# Patient Record
Sex: MALE | URBAN - METROPOLITAN AREA
[De-identification: names, ages, dates, MRNs, and addresses within clinical notes are randomized per-mention and may not be internally consistent; named-entity substitution may affect disease eponyms.]

---

## 2021-08-05 ENCOUNTER — OFFICE VISIT (OUTPATIENT)
Dept: URBAN - METROPOLITAN AREA CLINIC 31 | Facility: CLINIC | Age: 86
End: 2021-08-05
Payer: COMMERCIAL

## 2021-08-05 DIAGNOSIS — H35.372 PUCKERING OF MACULA, LEFT EYE: ICD-10-CM

## 2021-08-05 DIAGNOSIS — H43.813 VITREOUS DEGENERATION, BILATERAL: ICD-10-CM

## 2021-08-05 DIAGNOSIS — H35.3132 NEXDTVE AGE-RELATED MCLR DEGN, BILATERAL, INTERMED DRY STAGE: Primary | ICD-10-CM

## 2021-08-05 DIAGNOSIS — Z96.1 PRESENCE OF INTRAOCULAR LENS: ICD-10-CM

## 2021-08-05 DIAGNOSIS — H02.409 PTOSIS OF EYELID: ICD-10-CM

## 2021-08-05 PROCEDURE — 92134 CPTRZ OPH DX IMG PST SGM RTA: CPT | Performed by: OPHTHALMOLOGY

## 2021-08-05 PROCEDURE — 99204 OFFICE O/P NEW MOD 45 MIN: CPT | Performed by: OPHTHALMOLOGY

## 2021-08-05 ASSESSMENT — INTRAOCULAR PRESSURE
OD: 11
OS: 11

## 2021-08-05 NOTE — IMPRESSION/PLAN
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 Plan: Exam demonstrates dermatochalasis with ptosis. This may be starting to become visually significant as patient is manually trying to raise his eyelids. Recommend referral for evaluation.

## 2022-08-11 ENCOUNTER — OFFICE VISIT (OUTPATIENT)
Dept: URBAN - METROPOLITAN AREA CLINIC 31 | Facility: CLINIC | Age: 87
End: 2022-08-11
Payer: COMMERCIAL

## 2022-08-11 DIAGNOSIS — H35.372 PUCKERING OF MACULA, LEFT EYE: ICD-10-CM

## 2022-08-11 DIAGNOSIS — Z96.1 PRESENCE OF INTRAOCULAR LENS: ICD-10-CM

## 2022-08-11 DIAGNOSIS — H35.3132 NEXDTVE AGE-RELATED MCLR DEGN, BILATERAL, INTERMED DRY STAGE: Primary | ICD-10-CM

## 2022-08-11 DIAGNOSIS — H02.409 PTOSIS OF EYELID: ICD-10-CM

## 2022-08-11 DIAGNOSIS — H43.813 VITREOUS DEGENERATION, BILATERAL: ICD-10-CM

## 2022-08-11 PROCEDURE — 92012 INTRM OPH EXAM EST PATIENT: CPT | Performed by: OPHTHALMOLOGY

## 2022-08-11 PROCEDURE — 92134 CPTRZ OPH DX IMG PST SGM RTA: CPT | Performed by: OPHTHALMOLOGY

## 2022-08-11 ASSESSMENT — INTRAOCULAR PRESSURE
OS: 12
OD: 10

## 2025-07-07 NOTE — IMPRESSION/PLAN
Impression: Nexdtve age-related mclr degn, bilateral, intermed dry stage: H35.3132. Plan: Patient notes distortion in vision OD since last visit. Exam and OCT demonstrate stable drusen and RPE changes confirming AMD is still dry. The patient was advised to continue AREDS 2 vitamin supplements; the risk of smoking was emphasized with the patient. The patient was also advised to continue to use an [address] to monitor the vision and to call immediately with any changes. Will closely monitor given suspicious findings on OCT. 

RTC 6-12 with OCT OU
Impression: Presence of intraocular lens: Z96.1. Plan: Looks great. Observe. Ok for a new MRx.
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 Plan: Exam demonstrates dermatochalasis with ptosis. This may be starting to become visually significant as patient is manually trying to raise his eyelids. Recommend referral for evaluation.
Impression: Puckering of macula, left eye: H35.372. Plan: Exam and OCT demonstrate a Macular Pucker. The diagnosis, natural history, and prognosis of ERMs, as well as the risks and benefits of PPV/MP versus observation were discussed at length. Given the patient's current visual acuity and minimal hindrance on activities of daily living, observation was recommended at this time.
Impression: Vitreous degeneration, bilateral: H43.813. Plan: Patient notes floaters. Exam demonstrates a PVD without any RD or RT on exam.  Discussed R/B/A of PPV vs observation for the floaters. The floaters are not debilitating and so observation was recommended. Reassurance provided. RDW discussed. Precautions discussed with the patient.
Speaking Coherently